# Patient Record
Sex: MALE | Employment: FULL TIME | URBAN - METROPOLITAN AREA
[De-identification: names, ages, dates, MRNs, and addresses within clinical notes are randomized per-mention and may not be internally consistent; named-entity substitution may affect disease eponyms.]

---

## 2020-08-14 PROBLEM — R76.8 HEPATITIS B ANTIBODY POSITIVE: Status: ACTIVE | Noted: 2020-08-14

## 2020-08-14 PROBLEM — K21.9 GASTROESOPHAGEAL REFLUX DISEASE: Status: ACTIVE | Noted: 2020-08-14

## 2020-08-14 PROBLEM — R10.11 RUQ PAIN: Status: ACTIVE | Noted: 2020-08-14

## 2022-01-14 ENCOUNTER — OFFICE VISIT (OUTPATIENT)
Dept: SURGERY | Facility: CLINIC | Age: 42
End: 2022-01-14
Payer: COMMERCIAL

## 2022-01-14 DIAGNOSIS — R82.90 URINE FINDING: Primary | ICD-10-CM

## 2022-01-14 DIAGNOSIS — N46.9 MALE INFERTILITY: ICD-10-CM

## 2022-01-14 PROCEDURE — 99203 OFFICE O/P NEW LOW 30 MIN: CPT | Performed by: UROLOGY

## 2022-01-14 NOTE — PROGRESS NOTES
Rooming Clinician:     Alda Boss is a 40year old male.   Miscellaneous Urology:  Chief Complaint: Patient presents with:  Consult: SEMAN ANALYSIS, PREP FOR HAVING A CHILD    Date of Onset: UNKNOWN  Pain: No    Injury or Trauma: No    Treatments: NONE and denies heartburn  : see HPI  NEURO: no sensory or motor complaint    EXAM:     There were no vitals taken for this visit.   GENERAL: well developed, well nourished,in no apparent distress  SKIN: no rashes,no suspicious lesions  HEENT: atraumatic, norm

## 2022-01-21 ENCOUNTER — TELEPHONE (OUTPATIENT)
Dept: SURGERY | Facility: CLINIC | Age: 42
End: 2022-01-21

## 2022-01-21 NOTE — TELEPHONE ENCOUNTER
RN faxed the Semen Analysis order to Dr Sylwia Chavez office [phone]    RN called patient and updated him. He agreed to plans and verbalized understanding.

## 2022-01-21 NOTE — TELEPHONE ENCOUNTER
Per pt states faxed was not received, asking to please fax again, fax number below confirmed. Thank you.

## 2022-06-23 ENCOUNTER — TELEPHONE (OUTPATIENT)
Facility: LOCATION | Age: 42
End: 2022-06-23

## 2022-06-23 NOTE — TELEPHONE ENCOUNTER
Pt called to schedule an appt FOR HEMORRHOID CONSULT W DR Hankins . He did not like the availability offered to him. I told him I COULD GET HIM IN SOONER W ANOTHER PROVIDER.  hE SAID HE WILL CALL BACK

## 2022-06-24 ENCOUNTER — OFFICE VISIT (OUTPATIENT)
Facility: LOCATION | Age: 42
End: 2022-06-24
Payer: COMMERCIAL

## 2022-06-24 VITALS
WEIGHT: 180 LBS | DIASTOLIC BLOOD PRESSURE: 70 MMHG | TEMPERATURE: 98 F | HEIGHT: 66 IN | SYSTOLIC BLOOD PRESSURE: 111 MMHG | BODY MASS INDEX: 28.93 KG/M2 | HEART RATE: 80 BPM

## 2022-06-24 DIAGNOSIS — K60.0 ACUTE POSTERIOR ANAL FISSURE: Primary | ICD-10-CM

## 2022-06-24 PROCEDURE — 3008F BODY MASS INDEX DOCD: CPT | Performed by: SURGERY

## 2022-06-24 PROCEDURE — 99203 OFFICE O/P NEW LOW 30 MIN: CPT | Performed by: SURGERY

## 2022-06-24 PROCEDURE — 3074F SYST BP LT 130 MM HG: CPT | Performed by: SURGERY

## 2022-06-24 PROCEDURE — 3078F DIAST BP <80 MM HG: CPT | Performed by: SURGERY

## 2022-06-24 RX ORDER — CHOLECALCIFEROL (VITAMIN D3) 1250 MCG
CAPSULE ORAL
COMMUNITY

## 2022-07-25 ENCOUNTER — TELEPHONE (OUTPATIENT)
Facility: LOCATION | Age: 42
End: 2022-07-25

## 2022-07-25 NOTE — TELEPHONE ENCOUNTER
Patient saw Dr. Missy Wellington on 6/24 for anal fissure, and will see him for follow up on 8/12. Patient is continuing to have pain around rectum and pain has been more frequent. Has been using medication but it isn't helping. Please call him back at [phone] - he wants to talk with a nurse.

## 2022-07-25 NOTE — TELEPHONE ENCOUNTER
Pt still having pain with BM. Using nifedipine ointment without success. Pt instructed to do sitz baths and continue with medication. Appt moved up 2 wks. 2nd appt with partner for next week.

## 2022-08-02 ENCOUNTER — OFFICE VISIT (OUTPATIENT)
Facility: LOCATION | Age: 42
End: 2022-08-02
Payer: COMMERCIAL

## 2022-08-02 DIAGNOSIS — K60.0 ACUTE POSTERIOR ANAL FISSURE: Primary | ICD-10-CM

## 2022-08-02 PROCEDURE — 99214 OFFICE O/P EST MOD 30 MIN: CPT | Performed by: SURGERY

## 2022-08-12 ENCOUNTER — OFFICE VISIT (OUTPATIENT)
Facility: LOCATION | Age: 42
End: 2022-08-12
Payer: COMMERCIAL

## 2022-08-12 VITALS — TEMPERATURE: 98 F | HEART RATE: 87 BPM

## 2022-08-12 DIAGNOSIS — K60.0 ACUTE POSTERIOR ANAL FISSURE: Primary | ICD-10-CM

## 2022-08-12 PROCEDURE — 99213 OFFICE O/P EST LOW 20 MIN: CPT | Performed by: SURGERY

## 2022-08-17 ENCOUNTER — TELEPHONE (OUTPATIENT)
Facility: LOCATION | Age: 42
End: 2022-08-17

## 2022-08-17 NOTE — TELEPHONE ENCOUNTER
Pt was recently given Nitroglycerin cream for rectal pain.  He has questions for the nurse re: the usage of the cream. Please call to discuss

## 2022-08-18 NOTE — TELEPHONE ENCOUNTER
Gave instructions for use of rectal nitroglycerin - use pea size drop around anus 2-3 times daily. Pt VU.

## 2023-02-27 ENCOUNTER — OFFICE VISIT (OUTPATIENT)
Facility: LOCATION | Age: 43
End: 2023-02-27
Payer: COMMERCIAL

## 2023-02-27 DIAGNOSIS — L29.0 PRURITUS ANI: Primary | ICD-10-CM

## 2023-09-06 PROBLEM — E55.9 VITAMIN D DEFICIENCY: Status: ACTIVE | Noted: 2023-09-06

## 2023-09-06 PROBLEM — E78.5 HYPERLIPIDEMIA: Status: ACTIVE | Noted: 2023-09-06

## 2023-09-06 PROBLEM — K60.0 ACUTE POSTERIOR ANAL FISSURE: Status: ACTIVE | Noted: 2022-06-24

## 2023-09-06 PROBLEM — D53.1 MEGALOBLASTIC ANEMIA DUE TO VITAMIN B12 DEFICIENCY: Status: ACTIVE | Noted: 2023-09-06

## 2023-09-06 PROBLEM — R76.8 HEPATITIS B ANTIBODY POSITIVE: Status: ACTIVE | Noted: 2020-08-14

## 2023-09-06 PROBLEM — K21.9 GASTROESOPHAGEAL REFLUX DISEASE: Status: ACTIVE | Noted: 2020-08-14

## 2023-09-06 PROBLEM — R10.11 RUQ PAIN: Status: ACTIVE | Noted: 2020-08-14

## 2023-09-06 PROBLEM — L29.0 PRURITUS ANI: Status: ACTIVE | Noted: 2023-02-27

## 2023-09-06 PROBLEM — D51.9 VITAMIN B12 DEFICIENCY ANEMIA: Status: ACTIVE | Noted: 2023-09-06

## 2023-12-29 PROBLEM — L65.9 ALOPECIA: Status: ACTIVE | Noted: 2023-12-29

## 2024-10-21 ENCOUNTER — APPOINTMENT (OUTPATIENT)
Dept: CT IMAGING | Age: 44
End: 2024-10-21

## 2024-10-21 DIAGNOSIS — Z13.6 SCREENING FOR HEART DISEASE: ICD-10-CM

## 2024-10-21 PROCEDURE — 75571 CT HRT W/O DYE W/CA TEST: CPT | Performed by: RADIOLOGY

## (undated) NOTE — LETTER
23    Patient: Jem Paulson  : 1980 Visit date: 2023    Dear  Zulay Gonzales MD    Thank you for referring eJm Paulson to my practice. Please find my assessment and plan below. Assessment   Pruritus ani  (primary encounter diagnosis)    This is a 56-year-old gentleman who has seen my partners Dr. Olvin Callahan and Dr. Inna Etienne for anorectal pain in the past and has been treated for an anal fissure. He has been dealing with symptoms from the fissure from 2022 through 2022. In the month of January, his symptoms seem to have resolved. However for the month of February, patient now complains of itching around his anus and in the posterior skin towards the tailbone. He has been using an over-the-counter homeopathic topical medication with some relief. He has also been using over-the-counter anti-itch cream. Bedside exam shows hypopigmentation in the posterior midline perianal skin tracking up toward the  cleft. The anoderm otherwise appears normal and there is no drainage. No fissure visualized. Limited digital rectal exam shows very strong sphincter tone. Patient was unable to tolerate full digital rectal exam or anoscopy. I suspect the itching is from overuse of topical steroid cream and/or homeopathic medication causing chronic skin breakdown and irritation. Plan  I suggest starting with measures to reduce symptoms from pruritus ani including stopping the use of hydrocortisone, limiting intake of caffeine, beer, chocolate and spicy foods and keeping the area dry with a dry gauze or cotton ball. I gave him samples of Calmoseptine to try to see if this will help relieve his symptoms. I would like to see the patient back in 1 month for follow-up.  If his symptoms and exam are unchanged, I would take the patient for surgery with exam under anesthesia and anoscopy to more thoroughly examine the area with possible biopsy, possible skin tag excision, possible Botox or sphincterotomy if there is a persistent nonhealing anal fissure discovered. Patient expressed understanding was agreeable to plan.       Sincerely,       Dereck Barraza MD   CC: No Recipients

## (undated) NOTE — Clinical Note
I had the pleasure of seeing Mr. Kelin Acharya in my office today. Please see my attached note.         Angie Bentley

## (undated) NOTE — LETTER
22    Patient: Rosie Schroeder  : 1980 Visit date: 2022    Dear  Lino Velazquez MD    Thank you for referring Rosie Schroeder to my practice. Please find my assessment and plan below. History of Present Illness    Today, I am seeing this patient for follow up- from posterior anal fissure previously seen by Dr. Kita Hatchet. The patient presents to clinic today with continued rectal pain. The patient was previously seen by my associate, Dr. Kita Hatchet, and was prescribed nifedipine compound with lidocaine. The patient states he has been applying this cream as instructed with minimal relief. The patient has been using this compound twice a day. The patient reports constipation. He initially was told to discontinue stool softeners and Preparation H. The patient states that he contacted the office last week and was instructed to begin stool softeners. The patient states he has also been taking Metamucil daily. The patient denies constipation since resuming the stool softeners but continues to have pain with bowel movements. The patient also reports bright red bleeding in his stool and with wiping. The patient reports bleeding with every bowel movement. On examination there is a residual perianal fissure with some granulation tissue at the base. This remains tender to palpation. Anoscopy was not performed due to patient discomfort. The patient will resume a regimen of Metamucil and stool softeners as needed to maintain a soft and easy to pass stool. We will begin a trial of nitroglycerin rather than nifedipine in an effort to heal this fissure medically. The possible need for a close lateral sphincterotomy should medical management be unsuccessful was discussed. Once this fissure is healed, patient will avoid constipation with the use of dietary fiber, hydration and will discontinue stool softeners at that time. The importance of surveillance colonoscopy was also reviewed. The patient will discuss surveillance colonoscopy with Dr. Gertrude Khan. Family history-MI, diabetes-no family history colorectal carcinoma, Crohn's disease, ulcerative colitis      Assessment   No diagnosis found. Plan     Begin a trial of nitroglycerin for treatment residual perianal fissure.   Follow-up with Dr. Gertrude Khan in 2 weeks to discuss clinical progress    Thank you for allowing me to participate in your patient's care         Sincerely,       Santa Redd MD   CC: No Recipients